# Patient Record
Sex: MALE | Race: WHITE | NOT HISPANIC OR LATINO | Employment: OTHER | URBAN - METROPOLITAN AREA
[De-identification: names, ages, dates, MRNs, and addresses within clinical notes are randomized per-mention and may not be internally consistent; named-entity substitution may affect disease eponyms.]

---

## 2022-10-08 ENCOUNTER — APPOINTMENT (OUTPATIENT)
Dept: RADIOLOGY | Facility: MEDICAL CENTER | Age: 67
End: 2022-10-08
Attending: EMERGENCY MEDICINE
Payer: COMMERCIAL

## 2022-10-08 ENCOUNTER — HOSPITAL ENCOUNTER (OUTPATIENT)
Facility: MEDICAL CENTER | Age: 67
End: 2022-10-09
Attending: EMERGENCY MEDICINE | Admitting: HOSPITALIST
Payer: COMMERCIAL

## 2022-10-08 ENCOUNTER — APPOINTMENT (OUTPATIENT)
Dept: RADIOLOGY | Facility: MEDICAL CENTER | Age: 67
End: 2022-10-08
Attending: HOSPITALIST
Payer: COMMERCIAL

## 2022-10-08 DIAGNOSIS — G45.9 TIA (TRANSIENT ISCHEMIC ATTACK): ICD-10-CM

## 2022-10-08 PROBLEM — E78.5 DYSLIPIDEMIA: Status: ACTIVE | Noted: 2022-10-08

## 2022-10-08 PROBLEM — Z86.79 HISTORY OF HYPERTENSION: Status: ACTIVE | Noted: 2022-10-08

## 2022-10-08 PROBLEM — E66.9 DIABETES MELLITUS TYPE 2 IN OBESE: Status: ACTIVE | Noted: 2022-10-08

## 2022-10-08 PROBLEM — E11.69 DIABETES MELLITUS TYPE 2 IN OBESE: Status: ACTIVE | Noted: 2022-10-08

## 2022-10-08 LAB
ABO + RH BLD: NORMAL
ABO GROUP BLD: NORMAL
ALBUMIN SERPL BCP-MCNC: 4.4 G/DL (ref 3.2–4.9)
ALBUMIN/GLOB SERPL: 1.8 G/DL
ALP SERPL-CCNC: 115 U/L (ref 30–99)
ALT SERPL-CCNC: 29 U/L (ref 2–50)
ANION GAP SERPL CALC-SCNC: 11 MMOL/L (ref 7–16)
APTT PPP: 31.1 SEC (ref 24.7–36)
AST SERPL-CCNC: 28 U/L (ref 12–45)
BASOPHILS # BLD AUTO: 0.9 % (ref 0–1.8)
BASOPHILS # BLD: 0.08 K/UL (ref 0–0.12)
BILIRUB SERPL-MCNC: 0.9 MG/DL (ref 0.1–1.5)
BLD GP AB SCN SERPL QL: NORMAL
BUN SERPL-MCNC: 15 MG/DL (ref 8–22)
CALCIUM SERPL-MCNC: 9.5 MG/DL (ref 8.5–10.5)
CHLORIDE SERPL-SCNC: 101 MMOL/L (ref 96–112)
CO2 SERPL-SCNC: 27 MMOL/L (ref 20–33)
CREAT SERPL-MCNC: 1.18 MG/DL (ref 0.5–1.4)
EKG IMPRESSION: NORMAL
EOSINOPHIL # BLD AUTO: 0.62 K/UL (ref 0–0.51)
EOSINOPHIL NFR BLD: 6.6 % (ref 0–6.9)
ERYTHROCYTE [DISTWIDTH] IN BLOOD BY AUTOMATED COUNT: 41.7 FL (ref 35.9–50)
EST. AVERAGE GLUCOSE BLD GHB EST-MCNC: 177 MG/DL
GFR SERPLBLD CREATININE-BSD FMLA CKD-EPI: 67 ML/MIN/1.73 M 2
GLOBULIN SER CALC-MCNC: 2.5 G/DL (ref 1.9–3.5)
GLUCOSE SERPL-MCNC: 206 MG/DL (ref 65–99)
HBA1C MFR BLD: 7.8 % (ref 4–5.6)
HCT VFR BLD AUTO: 42.6 % (ref 42–52)
HGB BLD-MCNC: 14.7 G/DL (ref 14–18)
IMM GRANULOCYTES # BLD AUTO: 0.03 K/UL (ref 0–0.11)
IMM GRANULOCYTES NFR BLD AUTO: 0.3 % (ref 0–0.9)
INR PPP: 1 (ref 0.87–1.13)
LYMPHOCYTES # BLD AUTO: 2.08 K/UL (ref 1–4.8)
LYMPHOCYTES NFR BLD: 22.2 % (ref 22–41)
MCH RBC QN AUTO: 30.8 PG (ref 27–33)
MCHC RBC AUTO-ENTMCNC: 34.5 G/DL (ref 33.7–35.3)
MCV RBC AUTO: 89.3 FL (ref 81.4–97.8)
MONOCYTES # BLD AUTO: 0.86 K/UL (ref 0–0.85)
MONOCYTES NFR BLD AUTO: 9.2 % (ref 0–13.4)
NEUTROPHILS # BLD AUTO: 5.71 K/UL (ref 1.82–7.42)
NEUTROPHILS NFR BLD: 60.8 % (ref 44–72)
NRBC # BLD AUTO: 0 K/UL
NRBC BLD-RTO: 0 /100 WBC
PLATELET # BLD AUTO: 239 K/UL (ref 164–446)
PMV BLD AUTO: 9.6 FL (ref 9–12.9)
POTASSIUM SERPL-SCNC: 4.4 MMOL/L (ref 3.6–5.5)
PROT SERPL-MCNC: 6.9 G/DL (ref 6–8.2)
PROTHROMBIN TIME: 13.1 SEC (ref 12–14.6)
RBC # BLD AUTO: 4.77 M/UL (ref 4.7–6.1)
RH BLD: NORMAL
SODIUM SERPL-SCNC: 139 MMOL/L (ref 135–145)
TROPONIN T SERPL-MCNC: 9 NG/L (ref 6–19)
WBC # BLD AUTO: 9.4 K/UL (ref 4.8–10.8)

## 2022-10-08 PROCEDURE — 85610 PROTHROMBIN TIME: CPT

## 2022-10-08 PROCEDURE — 99220 PR INITIAL OBSERVATION CARE,LEVL III: CPT | Performed by: HOSPITALIST

## 2022-10-08 PROCEDURE — 99204 OFFICE O/P NEW MOD 45 MIN: CPT | Performed by: PSYCHIATRY & NEUROLOGY

## 2022-10-08 PROCEDURE — 84484 ASSAY OF TROPONIN QUANT: CPT

## 2022-10-08 PROCEDURE — 700117 HCHG RX CONTRAST REV CODE 255: Performed by: EMERGENCY MEDICINE

## 2022-10-08 PROCEDURE — 99285 EMERGENCY DEPT VISIT HI MDM: CPT

## 2022-10-08 PROCEDURE — 70551 MRI BRAIN STEM W/O DYE: CPT

## 2022-10-08 PROCEDURE — 84443 ASSAY THYROID STIM HORMONE: CPT

## 2022-10-08 PROCEDURE — 70450 CT HEAD/BRAIN W/O DYE: CPT

## 2022-10-08 PROCEDURE — 83036 HEMOGLOBIN GLYCOSYLATED A1C: CPT

## 2022-10-08 PROCEDURE — 94760 N-INVAS EAR/PLS OXIMETRY 1: CPT

## 2022-10-08 PROCEDURE — 71045 X-RAY EXAM CHEST 1 VIEW: CPT

## 2022-10-08 PROCEDURE — 85025 COMPLETE CBC W/AUTO DIFF WBC: CPT

## 2022-10-08 PROCEDURE — 70496 CT ANGIOGRAPHY HEAD: CPT

## 2022-10-08 PROCEDURE — 86900 BLOOD TYPING SEROLOGIC ABO: CPT

## 2022-10-08 PROCEDURE — 86850 RBC ANTIBODY SCREEN: CPT

## 2022-10-08 PROCEDURE — A9270 NON-COVERED ITEM OR SERVICE: HCPCS | Performed by: HOSPITALIST

## 2022-10-08 PROCEDURE — 0042T CT-CEREBRAL PERFUSION ANALYSIS: CPT

## 2022-10-08 PROCEDURE — 700102 HCHG RX REV CODE 250 W/ 637 OVERRIDE(OP): Performed by: HOSPITALIST

## 2022-10-08 PROCEDURE — 36415 COLL VENOUS BLD VENIPUNCTURE: CPT

## 2022-10-08 PROCEDURE — 700111 HCHG RX REV CODE 636 W/ 250 OVERRIDE (IP): Performed by: HOSPITALIST

## 2022-10-08 PROCEDURE — 80053 COMPREHEN METABOLIC PANEL: CPT

## 2022-10-08 PROCEDURE — G0378 HOSPITAL OBSERVATION PER HR: HCPCS

## 2022-10-08 PROCEDURE — 86901 BLOOD TYPING SEROLOGIC RH(D): CPT

## 2022-10-08 PROCEDURE — 70498 CT ANGIOGRAPHY NECK: CPT

## 2022-10-08 PROCEDURE — 85730 THROMBOPLASTIN TIME PARTIAL: CPT

## 2022-10-08 PROCEDURE — 93005 ELECTROCARDIOGRAM TRACING: CPT | Performed by: EMERGENCY MEDICINE

## 2022-10-08 PROCEDURE — 96372 THER/PROPH/DIAG INJ SC/IM: CPT

## 2022-10-08 RX ORDER — ONDANSETRON 2 MG/ML
4 INJECTION INTRAMUSCULAR; INTRAVENOUS EVERY 4 HOURS PRN
Status: DISCONTINUED | OUTPATIENT
Start: 2022-10-08 | End: 2022-10-09 | Stop reason: HOSPADM

## 2022-10-08 RX ORDER — EZETIMIBE 10 MG/1
10 TABLET ORAL EVERY EVENING
Status: DISCONTINUED | OUTPATIENT
Start: 2022-10-08 | End: 2022-10-09 | Stop reason: HOSPADM

## 2022-10-08 RX ORDER — AMLODIPINE AND VALSARTAN 10; 320 MG/1; MG/1
1 TABLET ORAL DAILY
COMMUNITY

## 2022-10-08 RX ORDER — AMOXICILLIN 250 MG
2 CAPSULE ORAL 2 TIMES DAILY
Status: DISCONTINUED | OUTPATIENT
Start: 2022-10-08 | End: 2022-10-09 | Stop reason: HOSPADM

## 2022-10-08 RX ORDER — HYDROCHLOROTHIAZIDE 12.5 MG/1
12.5 TABLET ORAL DAILY
Status: SHIPPED | COMMUNITY
End: 2022-12-06

## 2022-10-08 RX ORDER — POLYETHYLENE GLYCOL 3350 17 G/17G
1 POWDER, FOR SOLUTION ORAL
Status: DISCONTINUED | OUTPATIENT
Start: 2022-10-08 | End: 2022-10-09 | Stop reason: HOSPADM

## 2022-10-08 RX ORDER — ASPIRIN 325 MG
325 TABLET ORAL DAILY
Status: DISCONTINUED | OUTPATIENT
Start: 2022-10-08 | End: 2022-10-09 | Stop reason: HOSPADM

## 2022-10-08 RX ORDER — M-VIT,TX,IRON,MINS/CALC/FOLIC 27MG-0.4MG
1 TABLET ORAL DAILY
COMMUNITY

## 2022-10-08 RX ORDER — DICLOFENAC SODIUM 75 MG/1
75 TABLET, DELAYED RELEASE ORAL 2 TIMES DAILY PRN
Status: SHIPPED | COMMUNITY
End: 2022-12-06

## 2022-10-08 RX ORDER — ASPIRIN 300 MG/1
300 SUPPOSITORY RECTAL DAILY
Status: DISCONTINUED | OUTPATIENT
Start: 2022-10-08 | End: 2022-10-09 | Stop reason: HOSPADM

## 2022-10-08 RX ORDER — ATORVASTATIN CALCIUM 80 MG/1
80 TABLET, FILM COATED ORAL EVERY EVENING
Status: DISCONTINUED | OUTPATIENT
Start: 2022-10-08 | End: 2022-10-09 | Stop reason: HOSPADM

## 2022-10-08 RX ORDER — ONDANSETRON 4 MG/1
4 TABLET, ORALLY DISINTEGRATING ORAL EVERY 4 HOURS PRN
Status: DISCONTINUED | OUTPATIENT
Start: 2022-10-08 | End: 2022-10-09 | Stop reason: HOSPADM

## 2022-10-08 RX ORDER — ATORVASTATIN CALCIUM 80 MG/1
80 TABLET, FILM COATED ORAL DAILY
COMMUNITY

## 2022-10-08 RX ORDER — DEXTROSE MONOHYDRATE 25 G/50ML
25 INJECTION, SOLUTION INTRAVENOUS
Status: DISCONTINUED | OUTPATIENT
Start: 2022-10-08 | End: 2022-10-09 | Stop reason: HOSPADM

## 2022-10-08 RX ORDER — BISACODYL 10 MG
10 SUPPOSITORY, RECTAL RECTAL
Status: DISCONTINUED | OUTPATIENT
Start: 2022-10-08 | End: 2022-10-09 | Stop reason: HOSPADM

## 2022-10-08 RX ORDER — ACETAMINOPHEN 325 MG/1
650 TABLET ORAL EVERY 6 HOURS PRN
Status: DISCONTINUED | OUTPATIENT
Start: 2022-10-08 | End: 2022-10-09 | Stop reason: HOSPADM

## 2022-10-08 RX ORDER — COVID-19 ANTIGEN TEST
440 KIT MISCELLANEOUS
Status: SHIPPED | COMMUNITY
End: 2022-12-06

## 2022-10-08 RX ORDER — ASPIRIN 81 MG/1
324 TABLET, CHEWABLE ORAL DAILY
Status: DISCONTINUED | OUTPATIENT
Start: 2022-10-08 | End: 2022-10-09 | Stop reason: HOSPADM

## 2022-10-08 RX ORDER — ENOXAPARIN SODIUM 100 MG/ML
40 INJECTION SUBCUTANEOUS DAILY
Status: DISCONTINUED | OUTPATIENT
Start: 2022-10-08 | End: 2022-10-09 | Stop reason: HOSPADM

## 2022-10-08 RX ORDER — EZETIMIBE 10 MG/1
10 TABLET ORAL DAILY
COMMUNITY

## 2022-10-08 RX ADMIN — IOHEXOL 90 ML: 350 INJECTION, SOLUTION INTRAVENOUS at 15:26

## 2022-10-08 RX ADMIN — IOHEXOL 40 ML: 350 INJECTION, SOLUTION INTRAVENOUS at 15:21

## 2022-10-08 RX ADMIN — ENOXAPARIN SODIUM 40 MG: 40 INJECTION SUBCUTANEOUS at 18:53

## 2022-10-08 ASSESSMENT — ENCOUNTER SYMPTOMS
NECK PAIN: 0
ABDOMINAL PAIN: 0
HEMOPTYSIS: 0
BACK PAIN: 0
DIZZINESS: 0
LOSS OF CONSCIOUSNESS: 0
PHOTOPHOBIA: 0
COUGH: 0
WEAKNESS: 0
SENSORY CHANGE: 0
DEPRESSION: 0
HEADACHES: 0
WEIGHT LOSS: 0
HALLUCINATIONS: 0
TREMORS: 0
ORTHOPNEA: 0
NAUSEA: 0
NERVOUS/ANXIOUS: 0
BLURRED VISION: 1
SPUTUM PRODUCTION: 0
MYALGIAS: 0
MEMORY LOSS: 0
BLURRED VISION: 0
TINGLING: 0
FOCAL WEAKNESS: 0
PALPITATIONS: 0
FEVER: 0
DOUBLE VISION: 0
VOMITING: 0
SPEECH CHANGE: 1
SEIZURES: 0
CHILLS: 0
SHORTNESS OF BREATH: 0
FALLS: 0
HEARTBURN: 0

## 2022-10-08 ASSESSMENT — LIFESTYLE VARIABLES
DO YOU DRINK ALCOHOL: NO
SUBSTANCE_ABUSE: 0

## 2022-10-08 ASSESSMENT — PATIENT HEALTH QUESTIONNAIRE - PHQ9
2. FEELING DOWN, DEPRESSED, IRRITABLE, OR HOPELESS: NOT AT ALL
1. LITTLE INTEREST OR PLEASURE IN DOING THINGS: NOT AT ALL
SUM OF ALL RESPONSES TO PHQ9 QUESTIONS 1 AND 2: 0

## 2022-10-08 NOTE — ASSESSMENT & PLAN NOTE
Due to the acuity of his symptoms patient will have permissive hypertension until results of MRI of the brain

## 2022-10-08 NOTE — ASSESSMENT & PLAN NOTE
Insulin requiring    Will hold patient's in the pump while he is here in the hospital as he will be intermittently n.p.o.    Fingerstick with sliding scale insulin    Check hb A1c

## 2022-10-08 NOTE — ED PROVIDER NOTES
"CHIEF COMPLAINT  Chief Complaint   Patient presents with    ALOC     Family reports PT was confused about an hour ago unable to recognize family members. PT A/O to Person, Place and situation but is unable to tell me time. PT also was having a hard time word finding calling things by wrong name. Fast preformed in triage.        HPI  Bronson Porter is a 67 y.o. male who presents with acute onset confusion as well as inability to get his words out properly.  This started about an hour ago.  His symptoms seem to have improved and/or resolved.  He has no history of prior stroke.  He does have a history of diabetes and hypertension.  No headache.  No fever.  Nothing makes his symptoms better.  He has had no weakness or numbness.  No balance issues.    REVIEW OF SYSTEMS  All other systems are negative.     PAST MEDICAL HISTORY  Past Medical History:   Diagnosis Date    Diabetes (HCC)     Hypertension        FAMILY HISTORY  History reviewed. No pertinent family history.    SOCIAL HISTORY  Social History     Tobacco Use    Smoking status: Never    Smokeless tobacco: Never   Substance and Sexual Activity    Alcohol use: Never    Drug use: Never       SURGICAL HISTORY  History reviewed. No pertinent surgical history.    CURRENT MEDICATIONS  Home Medications    **Home medications have not yet been reviewed for this encounter**         ALLERGIES  Not on File    PHYSICAL EXAM  VITAL SIGNS: BP (!) 158/67   Pulse 74   Temp 36.2 °C (97.2 °F) (Temporal)   Resp 16   Ht 1.854 m (6' 1\")   Wt 124 kg (273 lb 9.5 oz)   SpO2 98%   BMI 36.10 kg/m²      Constitutional: Well developed, Well nourished, No acute distress, Non-toxic appearance.   HENT: Normocephalic, Atraumatic, TMs normal, mucous membranes moist, no erythema, exudates, swelling, or masses, nares patent  Eyes: nonicteric  Neck: Supple, no meningismus  Lymphatic: No lymphadenopathy noted.   Cardiovascular: Regular rate and rhythm, no gallops rubs or murmurs  Lungs: " Clear bilaterally   Abdomen: Bowel sounds normal, Soft, No tenderness, No pulsatile masses.   Skin: Warm, Dry, no rash  Back: No tenderness, No CVA tenderness.   Genitalia: Deferred  Rectal: Deferred  Extremities: No edema  Neurologic: Alert, appropriate, follows commands, moving all extremities, normal speech, finger-nose intact, no drift, no hemineglect, visual fields intact, NIHSS 0  Psychiatric: Affect normal    EKG      RADIOLOGY/PROCEDURES  DX-CHEST-PORTABLE (1 VIEW)   Final Result         1. No acute cardiopulmonary abnormalities are identified.      CT-CTA NECK WITH & W/O-POST PROCESSING   Final Result      1.  CT angiogram of the neck within normal limits.      2.  No stenoses      3.  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 No hemodynamically significant narrowing of the major intracranial vessels.      CT-CEREBRAL PERFUSION ANALYSIS   Final Result      1.  Cerebral blood flow less than 30% likely representing completed infarct = 0 mL.      2.  T Max more than 6 seconds likely representing combination of completed infarct and ischemia = 0 mL.      3.  Mismatched volume likely representing ischemic brain/penumbra = None      4.  Please note that the cerebral perfusion was performed on the limited brain tissue around the basal ganglia region. Infarct/ischemia outside the CT perfusion sections can be missed in this study.      CT-HEAD W/O   Final Result         1. No acute intracranial abnormality. No evidence of acute intracranial hemorrhage or mass lesion.                       Results for orders placed or performed during the hospital encounter of 10/08/22   CBC WITH DIFFERENTIAL   Result Value Ref Range    WBC 9.4 4.8 - 10.8 K/uL    RBC 4.77 4.70 - 6.10 M/uL    Hemoglobin 14.7 14.0 - 18.0 g/dL    Hematocrit 42.6 42.0 - 52.0 %    MCV 89.3 81.4 - 97.8 fL    MCH 30.8 27.0 - 33.0 pg    MCHC 34.5 33.7 - 35.3 g/dL    RDW  41.7 35.9 - 50.0 fL    Platelet Count 239 164 - 446 K/uL    MPV 9.6 9.0 - 12.9 fL    Neutrophils-Polys 60.80 44.00 - 72.00 %    Lymphocytes 22.20 22.00 - 41.00 %    Monocytes 9.20 0.00 - 13.40 %    Eosinophils 6.60 0.00 - 6.90 %    Basophils 0.90 0.00 - 1.80 %    Immature Granulocytes 0.30 0.00 - 0.90 %    Nucleated RBC 0.00 /100 WBC    Neutrophils (Absolute) 5.71 1.82 - 7.42 K/uL    Lymphs (Absolute) 2.08 1.00 - 4.80 K/uL    Monos (Absolute) 0.86 (H) 0.00 - 0.85 K/uL    Eos (Absolute) 0.62 (H) 0.00 - 0.51 K/uL    Baso (Absolute) 0.08 0.00 - 0.12 K/uL    Immature Granulocytes (abs) 0.03 0.00 - 0.11 K/uL    NRBC (Absolute) 0.00 K/uL   COMP METABOLIC PANEL   Result Value Ref Range    Sodium 139 135 - 145 mmol/L    Potassium 4.4 3.6 - 5.5 mmol/L    Chloride 101 96 - 112 mmol/L    Co2 27 20 - 33 mmol/L    Anion Gap 11.0 7.0 - 16.0    Glucose 206 (H) 65 - 99 mg/dL    Bun 15 8 - 22 mg/dL    Creatinine 1.18 0.50 - 1.40 mg/dL    Calcium 9.5 8.5 - 10.5 mg/dL    AST(SGOT) 28 12 - 45 U/L    ALT(SGPT) 29 2 - 50 U/L    Alkaline Phosphatase 115 (H) 30 - 99 U/L    Total Bilirubin 0.9 0.1 - 1.5 mg/dL    Albumin 4.4 3.2 - 4.9 g/dL    Total Protein 6.9 6.0 - 8.2 g/dL    Globulin 2.5 1.9 - 3.5 g/dL    A-G Ratio 1.8 g/dL   PROTHROMBIN TIME   Result Value Ref Range    PT 13.1 12.0 - 14.6 sec    INR 1.00 0.87 - 1.13   APTT   Result Value Ref Range    APTT 31.1 24.7 - 36.0 sec   TROPONIN   Result Value Ref Range    Troponin T 9 6 - 19 ng/L   ESTIMATED GFR   Result Value Ref Range    GFR (CKD-EPI) 67 >60 mL/min/1.73 m 2     EKG-time is 424, rate 57, sinus bradycardia, no ST elevation depression or T wave change    COURSE & MEDICAL DECISION MAKING  Pertinent Labs & Imaging studies reviewed. (See chart for details)  This is a 67-year-old male with a history of hypertension and diabetes who presents with garbled speech/difficulty getting his words out that started about an hour prior to arrival.  His symptoms have resolved.  The patient  underwent CT CTA with perfusion studies which were all unremarkable.  There is no hemorrhage.  The patient has no other symptoms and is back to his baseline.  He denies any chest pain or shortness of breath.  The patient will be admitted for further work-up to include MRI and echo.    FINAL IMPRESSION  1.  TIA  2.   3.         Electronically signed by: Jose Gayle M.D., 10/8/2022 2:56 PM

## 2022-10-08 NOTE — ASSESSMENT & PLAN NOTE
Patient was monitored on telemetry.    Neurochecks    Check a hemoglobin A1c and a lipid profile    MRI of the brain has been ordered    Check echocardiogram    Aspirin statin    Permissive hypertension    Neurology MD is on the case

## 2022-10-08 NOTE — H&P
Hospital Medicine History & Physical Note    Date of Service  10/8/2022    Primary Care Physician  No primary care provider on file.    Consultants  neurology    Specialist Names: [email or web address]    Code Status  Full Code    Chief Complaint  Chief Complaint   Patient presents with    ALOC     Family reports PT was confused about an hour ago unable to recognize family members. PT A/O to Person, Place and situation but is unable to tell me time. PT also was having a hard time word finding calling things by wrong name. Fast preformed in triage.        History of Presenting Illness  Bronson Porter is a 67 y.o. male who presented 10/8/2022 with past medical history of hypertension, dyslipidemia, diabetes mellitus type 2 insulin requiring brought to emergency room via EMS after family member noted an acute confusion with word finding difficulty.  The symptoms lasted for 40 minutes and then patient went back to his baseline.  Patient came in as a code stroke had a stat CT of the head and he was seen by neurology.  CT of the head did not show any acute abnormality.  Patient referred to me for further  care and management    No chest pain or shortness of breath    No headache    Blood glucose within normal limits    No fever or chills    .  He has some visual field defect on the right side that he stated is chronic.       I discussed the plan of care with patient.    Review of Systems  Review of Systems   Constitutional:  Negative for chills, fever and weight loss.   HENT:  Negative for ear discharge, ear pain, hearing loss and tinnitus.    Eyes:  Negative for blurred vision, double vision and photophobia.   Respiratory:  Negative for cough, hemoptysis and sputum production.    Cardiovascular:  Negative for chest pain, palpitations and orthopnea.   Gastrointestinal:  Negative for abdominal pain, heartburn, nausea and vomiting.   Genitourinary:  Negative for dysuria, frequency and urgency.   Musculoskeletal:  Negative for  back pain, myalgias and neck pain.   Neurological:  Positive for speech change. Negative for dizziness, tremors, sensory change, focal weakness and headaches.   Psychiatric/Behavioral:  Negative for depression, hallucinations, substance abuse and suicidal ideas. The patient is not nervous/anxious.      Past Medical History   has a past medical history of Diabetes (HCC) and Hypertension.    Dyslipidemia    Surgical History  Right wrist  Bilateral knees    Left ankle    Gallbladder removed    Family History  No family history of stroke  Family history reviewed with patient. There is no family history that is pertinent to the chief complaint.     Social History   reports that he has never smoked. He has never used smokeless tobacco. He reports that he does not drink alcohol and does not use drugs.    Allergies  nkda    Medications  No current facility-administered medications on file prior to encounter.     Current Outpatient Medications on File Prior to Encounter   Medication Sig Dispense Refill    hydroCHLOROthiazide (HYDRODIURIL) 12.5 MG tablet Take 12.5 mg by mouth every day.      atorvastatin (LIPITOR) 80 MG tablet Take 80 mg by mouth every day.      ezetimibe (ZETIA) 10 MG Tab Take 10 mg by mouth every day.      amlodipine-valsartan (EXFORGE)  MG per tablet Take 1 Tablet by mouth every day.      therapeutic multivitamin-minerals (THERAGRAN-M) Tab Take 1 Tablet by mouth every day.      Fish Oil-Cholecalciferol (FISH OIL + D3 PO) Take 1 Tablet by mouth every morning.      MAGNESIUM PO Take 1 Tablet by mouth every day.      Ascorbic Acid (VITAMIN C PO) Take 1 Tablet by mouth every day.      ALPHA LIPOIC ACID PO Take 1 Tablet by mouth every day.      CHROMIUM PO Take 1 Tablet by mouth every day.      diclofenac sodium (VOLTAREN) 1 % Gel Apply 2 g topically 4 times a day as needed (Arthritis). Applies to wrists      diclofenac DR (VOLTAREN) 75 MG Tablet Delayed Response Take 75 mg by mouth 2 times a day as  needed (Pain).      Naproxen Sodium 220 MG Cap Take 440 mg by mouth 1 time a day as needed (Pain).      insulin infusion pump Device Inject  under the skin continuous. Patient's own SQ insulin pump    Type of Insulin: Novolog  Last change of tubing: 10/7 - Change tubing and site every 72 hours    Dosing:  Basal rate:   0000 - 0400 = 1.9 units/hr             0400 - 0800 = 1.5 units/hr             0800 - 0000 = 2.2 units/hr  Bolus ratio:   1 unit : 6 g carbohydrate from 0800- 0000              1 unit : 10 g carbohydrate from 4141-5284  Correction ratio:   1 units for every 50 over 110 mg/dL    Disconnect pump if patient becomes hypoglycemic and altered.             Physical Exam  Temp:  [36.2 °C (97.2 °F)] 36.2 °C (97.2 °F)  Pulse:  [74] 74  Resp:  [16] 16  BP: (158)/(67) 158/67  SpO2:  [98 %] 98 %  Blood Pressure : (!) 158/67   Temperature: 36.2 °C (97.2 °F)   Pulse: 74   Respiration: 16   Pulse Oximetry: 98 %       Physical Exam  Constitutional:       General: He is not in acute distress.     Appearance: Normal appearance. He is not ill-appearing or diaphoretic.   HENT:      Head: Normocephalic and atraumatic.      Nose: Nose normal.      Mouth/Throat:      Mouth: Mucous membranes are moist.   Eyes:      Pupils: Pupils are equal, round, and reactive to light.      Comments: Mild horizontal nystagmus in both eyes   Cardiovascular:      Rate and Rhythm: Normal rate and regular rhythm.      Heart sounds: No murmur heard.    No friction rub. No gallop.   Pulmonary:      Effort: Pulmonary effort is normal. No respiratory distress.      Breath sounds: No stridor. No wheezing, rhonchi or rales.   Chest:      Chest wall: No tenderness.   Abdominal:      General: There is distension.      Palpations: There is no mass.      Tenderness: There is no abdominal tenderness. There is no right CVA tenderness or rebound.      Hernia: No hernia is present.   Musculoskeletal:         General: No swelling, tenderness, deformity or signs  of injury. Normal range of motion.      Cervical back: Normal range of motion and neck supple.      Right lower leg: No edema.      Left lower leg: No edema.   Skin:     General: Skin is warm.      Coloration: Skin is not jaundiced.      Findings: No bruising or lesion.   Neurological:      General: No focal deficit present.      Mental Status: He is alert and oriented to person, place, and time. Mental status is at baseline.      Cranial Nerves: No cranial nerve deficit.      Sensory: Sensory deficit present.      Motor: No weakness.      Gait: Gait normal.   Psychiatric:         Mood and Affect: Mood normal.       Laboratory:  Recent Labs     10/08/22  1447   WBC 9.4   RBC 4.77   HEMOGLOBIN 14.7   HEMATOCRIT 42.6   MCV 89.3   MCH 30.8   MCHC 34.5   RDW 41.7   PLATELETCT 239   MPV 9.6     Recent Labs     10/08/22  1447   SODIUM 139   POTASSIUM 4.4   CHLORIDE 101   CO2 27   GLUCOSE 206*   BUN 15   CREATININE 1.18   CALCIUM 9.5     Recent Labs     10/08/22  1447   ALTSGPT 29   ASTSGOT 28   ALKPHOSPHAT 115*   TBILIRUBIN 0.9   GLUCOSE 206*     Recent Labs     10/08/22  1447   APTT 31.1   INR 1.00     No results for input(s): NTPROBNP in the last 72 hours.      Recent Labs     10/08/22  1447   TROPONINT 9       Imaging:  DX-CHEST-PORTABLE (1 VIEW)   Final Result         1. No acute cardiopulmonary abnormalities are identified.      CT-CTA NECK WITH & W/O-POST PROCESSING   Final Result      1.  CT angiogram of the neck within normal limits.      2.  No stenoses      3.  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 No hemodynamically significant narrowing of the major intracranial vessels.      CT-CEREBRAL PERFUSION ANALYSIS   Final Result      1.  Cerebral blood flow less than 30% likely representing completed infarct = 0 mL.      2.  T Max more than 6 seconds likely representing combination of completed infarct and ischemia = 0 mL.       3.  Mismatched volume likely representing ischemic brain/penumbra = None      4.  Please note that the cerebral perfusion was performed on the limited brain tissue around the basal ganglia region. Infarct/ischemia outside the CT perfusion sections can be missed in this study.      CT-HEAD W/O   Final Result         1. No acute intracranial abnormality. No evidence of acute intracranial hemorrhage or mass lesion.                     MR-BRAIN-W/O    (Results Pending)   EC-ECHOCARDIOGRAM COMPLETE W/O CONT    (Results Pending)       EKG:  I have personally reviewed the images and compared with prior images.    Sinus bradycardia rate of 57 isolated T wave version lead v3    Assessment/Plan:  Justification for Admission Status  I anticipate this patient is appropriate for observation status at this time because expected the patient require less than 48 hours for further treatment evaluation of possible TIA        * TIA (transient ischemic attack)- (present on admission)  Assessment & Plan  Patient was monitored on telemetry.    Neurochecks    Check a hemoglobin A1c and a lipid profile    MRI of the brain has been ordered    Check echocardiogram    Aspirin statin    Permissive hypertension    Neurology MD is on the case    Dyslipidemia- (present on admission)  Assessment & Plan  Check lipid profile    Continue Lipitor and Zetia    Diabetes mellitus type 2 in obese (HCC)- (present on admission)  Assessment & Plan  Insulin requiring    Will hold patient's in the pump while he is here in the hospital as he will be intermittently n.p.o.    Fingerstick with sliding scale insulin    Check hb A1c    History of hypertension- (present on admission)  Assessment & Plan  Due to the acuity of his symptoms patient will have permissive hypertension until results of MRI of the brain          VTE prophylaxis: SCDs/TEDs

## 2022-10-08 NOTE — ED TRIAGE NOTES
Chief Complaint   Patient presents with    ALOC     Family reports PT was confused about an hour ago unable to recognize family members. PT A/O to Person, Place and situation but is unable to tell me time. PT also was having a hard time word finding calling things by wrong name. Fast preformed in triage.

## 2022-10-09 ENCOUNTER — APPOINTMENT (OUTPATIENT)
Dept: CARDIOLOGY | Facility: MEDICAL CENTER | Age: 67
End: 2022-10-09
Attending: HOSPITALIST
Payer: COMMERCIAL

## 2022-10-09 VITALS
WEIGHT: 273.59 LBS | DIASTOLIC BLOOD PRESSURE: 67 MMHG | SYSTOLIC BLOOD PRESSURE: 135 MMHG | BODY MASS INDEX: 36.26 KG/M2 | OXYGEN SATURATION: 93 % | TEMPERATURE: 97.8 F | HEIGHT: 73 IN | RESPIRATION RATE: 17 BRPM | HEART RATE: 59 BPM

## 2022-10-09 LAB
CHOLEST SERPL-MCNC: 82 MG/DL (ref 100–199)
GLUCOSE BLD STRIP.AUTO-MCNC: 183 MG/DL (ref 65–99)
HDLC SERPL-MCNC: 44 MG/DL
LDLC SERPL CALC-MCNC: 21 MG/DL
LV EJECT FRACT MOD 2C 99903: 56.94
LV EJECT FRACT MOD 4C 99902: 67.53
LV EJECT FRACT MOD BP 99901: 61.38
TRIGL SERPL-MCNC: 85 MG/DL (ref 0–149)
TSH SERPL DL<=0.005 MIU/L-ACNC: 1.34 UIU/ML (ref 0.38–5.33)

## 2022-10-09 PROCEDURE — 99217 PR OBSERVATION CARE DISCHARGE: CPT | Performed by: STUDENT IN AN ORGANIZED HEALTH CARE EDUCATION/TRAINING PROGRAM

## 2022-10-09 PROCEDURE — 93306 TTE W/DOPPLER COMPLETE: CPT | Mod: 26 | Performed by: STUDENT IN AN ORGANIZED HEALTH CARE EDUCATION/TRAINING PROGRAM

## 2022-10-09 PROCEDURE — 80061 LIPID PANEL: CPT

## 2022-10-09 PROCEDURE — G0378 HOSPITAL OBSERVATION PER HR: HCPCS

## 2022-10-09 PROCEDURE — 82962 GLUCOSE BLOOD TEST: CPT

## 2022-10-09 PROCEDURE — 93306 TTE W/DOPPLER COMPLETE: CPT

## 2022-10-09 ASSESSMENT — LIFESTYLE VARIABLES: ALCOHOL_USE: NO

## 2022-10-09 ASSESSMENT — PAIN DESCRIPTION - PAIN TYPE
TYPE: ACUTE PAIN
TYPE: ACUTE PAIN

## 2022-10-09 NOTE — DISCHARGE SUMMARY
"Discharge Summary    CHIEF COMPLAINT ON ADMISSION  Chief Complaint   Patient presents with    ALOC     Family reports PT was confused about an hour ago unable to recognize family members. PT A/O to Person, Place and situation but is unable to tell me time. PT also was having a hard time word finding calling things by wrong name. Fast preformed in triage.        Reason for Admission  Speech confusion and memory     Admission Date  10/8/2022    CODE STATUS  Full Code    HPI & HOSPITAL COURSE  Per Dr. Reich H&P dated 10/8/2022:  \"Bronson Porter is a 67 y.o. male who presented 10/8/2022 with past medical history of hypertension, dyslipidemia, diabetes mellitus type 2 insulin requiring brought to emergency room via EMS after family member noted an acute confusion with word finding difficulty.  The symptoms lasted for 40 minutes and then patient went back to his baseline.  Patient came in as a code stroke had a stat CT of the head and he was seen by neurology.  CT of the head did not show any acute abnormality.  Patient referred to me for further  care and management     No chest pain or shortness of breath  No headache  Blood glucose within normal limits  No fever or chills  He has some visual field defect on the right side that he stated is chronic.\"    Pt was admitted for TIA workup. Neurology consult appreciated.     10/9: stable vitals and afebrile. Exam at bedside grossly unremarkable and no gross focal neuro deficits. Quite pleasant and in good spirit but baffled as to how this happens. Still quite active and lipid control has been excellent. MRI done was without acute abnormalities. At this point, deemed stable for DC with a close outpatient follow up. Pt is already on statin and zetia. Stroke bridge clinic referral is provided.       Therefore, he is discharged in good and stable condition to home with close outpatient follow-up.    The patient recovered much more quickly than anticipated on " admission.    Discharge Date  10/9/2022    FOLLOW UP ITEMS POST DISCHARGE  N/A    DISCHARGE DIAGNOSES  Principal Problem:    TIA (transient ischemic attack) POA: Yes  Active Problems:    History of hypertension POA: Yes    Diabetes mellitus type 2 in obese (HCC) POA: Yes    Dyslipidemia POA: Yes  Resolved Problems:    * No resolved hospital problems. *      FOLLOW UP  Please follow up with your primary care physician within 1 to 2 weeks of discharge. [facility] if available.      MEDICATIONS ON DISCHARGE     Medication List        CONTINUE taking these medications        Instructions   ALPHA LIPOIC ACID PO   Take 1 Tablet by mouth every day.  Dose: 1 Tablet     amlodipine-valsartan  MG per tablet  Commonly known as: EXFORGE   Take 1 Tablet by mouth every day.  Dose: 1 Tablet     atorvastatin 80 MG tablet  Commonly known as: LIPITOR   Take 80 mg by mouth every day.  Dose: 80 mg     CHROMIUM PO   Take 1 Tablet by mouth every day.  Dose: 1 Tablet     diclofenac DR 75 MG Tbec  Commonly known as: Voltaren   Take 75 mg by mouth 2 times a day as needed (Pain).  Dose: 75 mg     diclofenac sodium 1 % Gel  Commonly known as: Voltaren   Apply 2 g topically 4 times a day as needed (Arthritis). Applies to wrists  Dose: 2 g     ezetimibe 10 MG Tabs  Commonly known as: ZETIA   Take 10 mg by mouth every day.  Dose: 10 mg     FISH OIL + D3 PO   Take 1 Tablet by mouth every morning.  Dose: 1 Tablet     hydroCHLOROthiazide 12.5 MG tablet  Commonly known as: HYDRODIURIL   Take 12.5 mg by mouth every day.  Dose: 12.5 mg     insulin infusion pump Aurea   Inject  under the skin continuous. Patient's own SQ insulin pump    Type of Insulin: Novolog  Last change of tubing: 10/7 - Change tubing and site every 72 hours    Dosing:  Basal rate:   0000 - 0400 = 1.9 units/hr             0400 - 0800 = 1.5 units/hr             0800 - 0000 = 2.2 units/hr  Bolus ratio:   1 unit : 6 g carbohydrate from 0800- 0000              1 unit : 10 g  carbohydrate from 0663-6676  Correction ratio:   1 units for every 50 over 110 mg/dL    Disconnect pump if patient becomes hypoglycemic and altered.     MAGNESIUM PO   Take 1 Tablet by mouth every day.  Dose: 1 Tablet     Naproxen Sodium 220 MG Caps   Take 440 mg by mouth 1 time a day as needed (Pain).  Dose: 440 mg     therapeutic multivitamin-minerals Tabs   Take 1 Tablet by mouth every day.  Dose: 1 Tablet     VITAMIN C PO   Take 1 Tablet by mouth every day.  Dose: 1 Tablet              Allergies  No Known Allergies    DIET  Orders Placed This Encounter   Procedures    Diet Order Diet: Consistent CHO (Diabetic); Second Modifier: (optional): Cardiac     Standing Status:   Standing     Number of Occurrences:   1     Order Specific Question:   Diet:     Answer:   Consistent CHO (Diabetic) [4]     Order Specific Question:   Second Modifier: (optional)     Answer:   Cardiac [6]       ACTIVITY  As tolerated.  Weight bearing as tolerated    CONSULTATIONS  Neurology    PROCEDURES  N/A    LABORATORY  Lab Results   Component Value Date    SODIUM 139 10/08/2022    POTASSIUM 4.4 10/08/2022    CHLORIDE 101 10/08/2022    CO2 27 10/08/2022    GLUCOSE 206 (H) 10/08/2022    BUN 15 10/08/2022    CREATININE 1.18 10/08/2022        Lab Results   Component Value Date    WBC 9.4 10/08/2022    HEMOGLOBIN 14.7 10/08/2022    HEMATOCRIT 42.6 10/08/2022    PLATELETCT 239 10/08/2022        Total time of the discharge process exceeds 36 minutes.

## 2022-10-09 NOTE — CARE PLAN
The patient is Stable - Low risk of patient condition declining or worsening    Shift Goals  Clinical Goals: TIA monitoring  Patient Goals: Go home  Family Goals: Patient to go home    Progress made toward(s) clinical / shift goals:    Problem: Optimal Care of the Stroke Patient  Goal: Optimal emergency care for the stroke patient  Outcome: Met  Goal: Optimal acute care for the stroke patient  Outcome: Met     Problem: Knowledge Deficit - Stroke Education  Goal: Patient's knowledge of stroke and risk factors will improve  Outcome: Met     Problem: Psychosocial - Patient Condition  Goal: Patient's ability to verbalize feelings about condition will improve  Outcome: Met  Goal: Patient's ability to re-evaluate and adapt role responsibilities will improve  Outcome: Met     Problem: Discharge Planning - Stroke  Goal: Ensure Stroke Core Measures are met prior to discharge  Outcome: Met  Goal: Patient’s continuum of care needs will be met  Outcome: Met     Problem: Neuro Status  Goal: Neuro status will remain stable or improve  Outcome: Met     Problem: Hemodynamic Monitoring  Goal: Patient's hemodynamics, fluid balance and neurologic status will be stable or improve  Outcome: Met     Problem: Respiratory - Stroke Patient  Goal: Patient will achieve/maintain optimum respiratory rate/effort  Outcome: Met     Problem: Dysphagia  Goal: Dysphagia will improve  Outcome: Met     Problem: Urinary Elimination  Goal: Establish and maintain regular urinary output  Outcome: Met     Problem: Bowel Elimination  Goal: Establish and maintain regular bowel function  Outcome: Met     Problem: Mobility - Stroke  Goal: Patient's capacity to carry out activities will improve  Outcome: Met  Goal: Spasticity will be prevented or improved  Outcome: Met  Goal: Subluxation will be prevented or improved  Outcome: Met     Problem: Self Care  Goal: Patient will have the ability to perform ADLs independently or with assistance (bathe, groom, dress,  toilet and feed)  Outcome: Met     Problem: Knowledge Deficit - Standard  Goal: Patient and family/care givers will demonstrate understanding of plan of care, disease process/condition, diagnostic tests and medications  Outcome: Met       Patient is not progressing towards the following goals:

## 2022-10-09 NOTE — PROGRESS NOTES
4 Eyes Skin Assessment Completed by VALDO Estrada and VALDO Bernstein.    Head WDL  Ears WDL  Nose WDL  Mouth WDL  Neck WDL  Breast/Chest WDL  Shoulder Blades WDL  Spine WDL  (R) Arm/Elbow/Hand WDL  (L) Arm/Elbow/Hand WDL  Abdomen WDL  Groin WDL  Scrotum/Coccyx/Buttocks WDL  (R) Leg WDL  (L) Leg WDL  (R) Heel/Foot/Toe WDL  (L) Heel/Foot/Toe WDL          Devices In Places Pulse Ox      Interventions In Place N/A    Possible Skin Injury No    Pictures Uploaded Into Epic N/A  Wound Consult Placed N/A  RN Wound Prevention Protocol Ordered No

## 2022-10-09 NOTE — DISCHARGE INSTRUCTIONS
Discharge Instructions    Discharged to home by car with relative. Discharged via wheelchair, hospital escort: Yes.  Special equipment needed: Not Applicable    Be sure to schedule a follow-up appointment with your primary care doctor or any specialists as instructed.     Discharge Plan:   Influenza Vaccine Indication: Patient Refuses    I understand that a diet low in cholesterol, fat, and sodium is recommended for good health. Unless I have been given specific instructions below for another diet, I accept this instruction as my diet prescription.   Other diet: Diabetic    Special Instructions: None    -Is this patient being discharged with medication to prevent blood clots?  No    Is patient discharged on Warfarin / Coumadin?   No

## 2022-10-09 NOTE — PROGRESS NOTES
IV removed, patient dressed, and discharge paperwork reviewed/signed. No questions at this time, all belongings on person when discharged home with family.

## 2022-10-09 NOTE — ED NOTES
Med Rec complete per patient  No oral antibiotics in the last 30 days per patient  Allergies reviewed  Patient has a CGM on him, unable to confirm settings

## 2022-10-09 NOTE — CARE PLAN
The patient is Stable - Low risk of patient condition declining or worsening    Shift Goals  Clinical Goals: TIA monitoring & neuro checks  Patient Goals: rest, glucose mgmt    Progress made toward(s) clinical / shift goals:    Problem: Optimal Care of the Stroke Patient  Goal: Optimal emergency care for the stroke patient  Outcome: Progressing  Goal: Optimal acute care for the stroke patient  Outcome: Progressing     Problem: Knowledge Deficit - Stroke Education  Goal: Patient's knowledge of stroke and risk factors will improve  Outcome: Progressing     Problem: Psychosocial - Patient Condition  Goal: Patient's ability to verbalize feelings about condition will improve  Outcome: Progressing  Goal: Patient's ability to re-evaluate and adapt role responsibilities will improve  Outcome: Progressing     Problem: Discharge Planning - Stroke  Goal: Ensure Stroke Core Measures are met prior to discharge  Outcome: Progressing  Goal: Patient’s continuum of care needs will be met  Outcome: Progressing     Problem: Neuro Status  Goal: Neuro status will remain stable or improve  Outcome: Progressing     Problem: Hemodynamic Monitoring  Goal: Patient's hemodynamics, fluid balance and neurologic status will be stable or improve  Outcome: Progressing     Problem: Respiratory - Stroke Patient  Goal: Patient will achieve/maintain optimum respiratory rate/effort  Outcome: Progressing     Problem: Dysphagia  Goal: Dysphagia will improve  Outcome: Progressing     Problem: Urinary Elimination  Goal: Establish and maintain regular urinary output  Outcome: Progressing     Problem: Bowel Elimination  Goal: Establish and maintain regular bowel function  Outcome: Progressing     Problem: Mobility - Stroke  Goal: Patient's capacity to carry out activities will improve  Outcome: Progressing  Goal: Spasticity will be prevented or improved  Outcome: Progressing  Goal: Subluxation will be prevented or improved  Outcome: Progressing     Problem:  Self Care  Goal: Patient will have the ability to perform ADLs independently or with assistance (bathe, groom, dress, toilet and feed)  Outcome: Progressing     Problem: Knowledge Deficit - Standard  Goal: Patient and family/care givers will demonstrate understanding of plan of care, disease process/condition, diagnostic tests and medications  Outcome: Progressing       Patient is not progressing towards the following goals:

## 2022-12-06 ENCOUNTER — OFFICE VISIT (OUTPATIENT)
Dept: NEUROLOGY | Facility: MEDICAL CENTER | Age: 67
End: 2022-12-06
Attending: PSYCHIATRY & NEUROLOGY
Payer: COMMERCIAL

## 2022-12-06 VITALS
HEIGHT: 73 IN | OXYGEN SATURATION: 96 % | HEART RATE: 71 BPM | RESPIRATION RATE: 14 BRPM | BODY MASS INDEX: 36.7 KG/M2 | DIASTOLIC BLOOD PRESSURE: 52 MMHG | SYSTOLIC BLOOD PRESSURE: 119 MMHG | TEMPERATURE: 97.4 F | WEIGHT: 276.9 LBS

## 2022-12-06 DIAGNOSIS — R47.9 TRANSIENT SPEECH DISTURBANCE: ICD-10-CM

## 2022-12-06 DIAGNOSIS — G45.9 TIA (TRANSIENT ISCHEMIC ATTACK): ICD-10-CM

## 2022-12-06 PROCEDURE — 99211 OFF/OP EST MAY X REQ PHY/QHP: CPT | Performed by: PSYCHIATRY & NEUROLOGY

## 2022-12-06 PROCEDURE — 99214 OFFICE O/P EST MOD 30 MIN: CPT | Performed by: PSYCHIATRY & NEUROLOGY

## 2022-12-06 RX ORDER — ASPIRIN 81 MG/1
81 TABLET, CHEWABLE ORAL DAILY
COMMUNITY

## 2022-12-06 ASSESSMENT — FIBROSIS 4 INDEX: FIB4 SCORE: 1.48

## 2022-12-06 NOTE — PROGRESS NOTES
Chief Complaint   Patient presents with    Establish Care     Patient states that he had a mini stroke October 8th, 2022 and needs clearance for a colonoscopy.        History of present illness:  Bronson Guillen [place] 67 y.o. male with DM2, HTN, HL admitted in Oct 2022 for confusion/word finding difficulty lasting 40 min. He was admitted for TIA workup.     He underwent brain CT followed by CT angiogram of the head and neck and CT perfusion which were all unremarkable.  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 A couple of words were misplaced for 1-2 minutes. The episode of confusion about zack being his daughter in law lasted 30 minutes. He was confused as well during the spell, refusing that a person was his daughter in law. He said 'mattress' instead of a word that he was trying to say.   He was driving well during the spell. He checked his glucose immediately which was normal.   No headache, weakness, sensory loss.   The hospital added aspirin 81mg daily.     Past medical history:   Past Medical History:   Diagnosis Date    Diabetes (HCC)     Hypertension        Past surgical history:   History reviewed. No pertinent surgical history.    Family history:   History reviewed. No pertinent family history.    Social history:   Social History     Socioeconomic History    Marital status: Unknown     Spouse name: Not on file    Number of children: Not on file    Years of education: Not on file    Highest education level: Not on file   Occupational History    Not on file   Tobacco Use    Smoking status: Never    Smokeless tobacco: Never   Vaping Use    Vaping Use: Never used   Substance and Sexual Activity    Alcohol use: Never    Drug use: Never    Sexual activity: Not on file   Other Topics Concern    Not on file   Social History Narrative    Not on file     Social  "Determinants of Health     Financial Resource Strain: Not on file   Food Insecurity: Not on file   Transportation Needs: Not on file   Physical Activity: Not on file   Stress: Not on file   Social Connections: Not on file   Intimate Partner Violence: Not on file   Housing Stability: Not on file       Current medications:   Current Outpatient Medications   Medication    aspirin (ASA) 81 MG Chew Tab chewable tablet    atorvastatin (LIPITOR) 80 MG tablet    ezetimibe (ZETIA) 10 MG Tab    amlodipine-valsartan (EXFORGE)  MG per tablet    MAGNESIUM PO    insulin infusion pump Device    therapeutic multivitamin-minerals (THERAGRAN-M) Tab    CHROMIUM PO     No current facility-administered medications for this visit.       Medication Allergy:  No Known Allergies    Physical examination:   Vitals:    12/06/22 0756   BP: 119/52   BP Location: Left arm   Patient Position: Sitting   BP Cuff Size: Adult   Pulse: 71   Resp: 14   Temp: 36.3 °C (97.4 °F)   TempSrc: Temporal   SpO2: 96%   Weight: (!) 126 kg (276 lb 14.4 oz)   Height: 1.854 m (6' 1\")     Neurological Exam  Mental Status  Awake and alert. Speech is normal. Language is fluent with no aphasia.    Cranial Nerves  CN V:  Right: Facial sensation is normal.  Left: Facial sensation is normal on the left.  CN VII:  Right: There is no facial weakness.  Left: There is no facial weakness.  CN XI:  Right: Trapezius strength is normal.  Left: Trapezius strength is normal.  CN XII: Tongue midline without atrophy or fasciculations.    Motor   No abnormal involuntary movements. Strength is 5/5 throughout all four extremities.    Sensory  Light touch is normal in upper and lower extremities.     Coordination  Right: Finger-to-nose normal.Left: Finger-to-nose normal.    Gait  Casual gait is normal including stance, stride, and arm swing.    Labs:  I reviewed the following labs personally:  Lab Results   Component Value Date/Time    HBA1C 7.8 (H) 10/08/2022 05:19 PM       Imaging: "   MRI brain w/o   IMPRESSION:        1. Age-related cerebral atrophy.     CTA neck   IMPRESSION:     1.  CT angiogram of the neck within normal limits.     2.  No stenoses     3.  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 No hemodynamically significant narrowing of the major intracranial vessels.    Echo   Echocardiography Laboratory     CONCLUSIONS  Normal left ventricular systolic function. The left ventricular   ejection fraction is visually estimated to be 60%  Asymmetric septal hypertrophy.   The right ventricle is mildly dilated. Normal right ventricular   systolic function.  No significant valvular abnormalities.  Agitated saline (bubble) study was performed with and without Valsalva   maneuver. No evidence of right to left shunt by agitated saline study.  No prior study is available for comparison.     ASSESSMENT AND PLAN:  Problem List Items Addressed This Visit       RESOLVED: TIA (transient ischemic attack)    Relevant Orders    Cardiac Event Monitor     Other Visit Diagnoses       Transient speech disturbance                1. Transient speech disturbance    2. TIA (transient ischemic attack)  - Cardiac Event Monitor; Future    Other orders  - aspirin (ASA) 81 MG Chew Tab chewable tablet; Chew 81 mg every day.    His event was characterized by a few word substitutions and confusion regarding the name of a family member. This event is of unclear etiology but does not sound severe enough to diagnose as a TIA and the duration of the word finding errors was short. He had a normal TIA workup in the hospital. He has not had a cardiac monitor, therefore I ordered a 14 day Ziopatch to complete the TIA workup. I have counseled him on the uncertainty of the diagnosis given this mild event. Nevertheless, it will be treated as if it was a TIA. He will continue aspirin.      FOLLOW-UP:   No follow-ups on file.    Total time spent for the day for this  patient unrelated to procedure time is: 33 minutes. I spent 23 minutes in face to face time and I spent 3 minutes pre-charting and 7 minutes in post-visit documentation.      Dr. Wade Luna D.O.  [facility]

## 2022-12-06 NOTE — PATIENT INSTRUCTIONS
I have ordered a Ziopatch monitor through cardiology. You will have a 14 day cardiac monitor to evaluate for atrial fibrillation.

## 2022-12-08 ENCOUNTER — TELEPHONE (OUTPATIENT)
Dept: HEALTH INFORMATION MANAGEMENT | Facility: OTHER | Age: 67
End: 2022-12-08
Payer: COMMERCIAL

## 2022-12-13 ENCOUNTER — NON-PROVIDER VISIT (OUTPATIENT)
Dept: CARDIOLOGY | Facility: MEDICAL CENTER | Age: 67
End: 2022-12-13
Attending: PSYCHIATRY & NEUROLOGY
Payer: COMMERCIAL

## 2022-12-13 DIAGNOSIS — I47.10 SVT (SUPRAVENTRICULAR TACHYCARDIA) (HCC): ICD-10-CM

## 2022-12-13 DIAGNOSIS — G45.9 TIA (TRANSIENT ISCHEMIC ATTACK): ICD-10-CM

## 2022-12-13 NOTE — PROGRESS NOTES
Home enrollment completed in the 14 day Zio XT Holter monitoring program, per Tito Luna D.O.  Monitor will be shipped to patient via [employer].  >Pending EOS.

## 2023-01-03 ENCOUNTER — TELEPHONE (OUTPATIENT)
Dept: CARDIOLOGY | Facility: MEDICAL CENTER | Age: 68
End: 2023-01-03
Payer: COMMERCIAL

## 2023-01-03 PROCEDURE — 93248 EXT ECG>7D<15D REV&INTERPJ: CPT | Performed by: INTERNAL MEDICINE

## 2023-01-09 ENCOUNTER — TELEPHONE (OUTPATIENT)
Dept: NEUROLOGY | Facility: MEDICAL CENTER | Age: 68
End: 2023-01-09
Payer: COMMERCIAL